# Patient Record
Sex: FEMALE | Race: OTHER | NOT HISPANIC OR LATINO | URBAN - METROPOLITAN AREA
[De-identification: names, ages, dates, MRNs, and addresses within clinical notes are randomized per-mention and may not be internally consistent; named-entity substitution may affect disease eponyms.]

---

## 2019-01-31 NOTE — ASU PATIENT PROFILE, ADULT - PSH
Hammertoe of right foot    History of bunionectomy of both great toes    History of surgery  right thyroid, parathyroid  History of tonsillectomy    Spinal cord stimulator status

## 2019-01-31 NOTE — ASU PATIENT PROFILE, ADULT - PMH
Anxiety    Back pain    Depression    Heart murmur    Hypothyroid    MVP (mitral valve prolapse)    Ovarian cyst

## 2019-02-01 ENCOUNTER — INPATIENT (INPATIENT)
Facility: HOSPITAL | Age: 74
LOS: 0 days | Discharge: HOME | DRG: 743 | End: 2019-02-02
Attending: OBSTETRICS & GYNECOLOGY | Admitting: OBSTETRICS & GYNECOLOGY
Payer: COMMERCIAL

## 2019-02-01 VITALS
RESPIRATION RATE: 16 BRPM | DIASTOLIC BLOOD PRESSURE: 58 MMHG | WEIGHT: 119.93 LBS | OXYGEN SATURATION: 99 % | TEMPERATURE: 97 F | HEART RATE: 88 BPM | HEIGHT: 62 IN | SYSTOLIC BLOOD PRESSURE: 121 MMHG

## 2019-02-01 DIAGNOSIS — Z96.89 PRESENCE OF OTHER SPECIFIED FUNCTIONAL IMPLANTS: Chronic | ICD-10-CM

## 2019-02-01 DIAGNOSIS — Z98.890 OTHER SPECIFIED POSTPROCEDURAL STATES: Chronic | ICD-10-CM

## 2019-02-01 DIAGNOSIS — Z90.89 ACQUIRED ABSENCE OF OTHER ORGANS: Chronic | ICD-10-CM

## 2019-02-01 DIAGNOSIS — M20.41 OTHER HAMMER TOE(S) (ACQUIRED), RIGHT FOOT: Chronic | ICD-10-CM

## 2019-02-01 RX ORDER — SCOPALAMINE 1 MG/3D
1 PATCH, EXTENDED RELEASE TRANSDERMAL ONCE
Qty: 0 | Refills: 0 | Status: COMPLETED | OUTPATIENT
Start: 2019-02-01 | End: 2019-02-01

## 2019-02-01 RX ORDER — ONDANSETRON 8 MG/1
4 TABLET, FILM COATED ORAL ONCE
Qty: 0 | Refills: 0 | Status: DISCONTINUED | OUTPATIENT
Start: 2019-02-01 | End: 2019-02-02

## 2019-02-01 RX ORDER — HYDROMORPHONE HYDROCHLORIDE 2 MG/ML
0.5 INJECTION INTRAMUSCULAR; INTRAVENOUS; SUBCUTANEOUS
Qty: 0 | Refills: 0 | Status: DISCONTINUED | OUTPATIENT
Start: 2019-02-01 | End: 2019-02-02

## 2019-02-01 RX ORDER — METOCLOPRAMIDE HCL 10 MG
10 TABLET ORAL EVERY 6 HOURS
Qty: 0 | Refills: 0 | Status: DISCONTINUED | OUTPATIENT
Start: 2019-02-01 | End: 2019-02-02

## 2019-02-01 RX ORDER — OXYCODONE HYDROCHLORIDE 5 MG/1
5 TABLET ORAL EVERY 6 HOURS
Qty: 0 | Refills: 0 | Status: DISCONTINUED | OUTPATIENT
Start: 2019-02-01 | End: 2019-02-02

## 2019-02-01 RX ORDER — OXYCODONE HYDROCHLORIDE 5 MG/1
10 TABLET ORAL EVERY 6 HOURS
Qty: 0 | Refills: 0 | Status: DISCONTINUED | OUTPATIENT
Start: 2019-02-01 | End: 2019-02-02

## 2019-02-01 RX ORDER — ACETAMINOPHEN 500 MG
650 TABLET ORAL EVERY 6 HOURS
Qty: 0 | Refills: 0 | Status: DISCONTINUED | OUTPATIENT
Start: 2019-02-01 | End: 2019-02-02

## 2019-02-01 RX ORDER — DOCUSATE SODIUM 100 MG
100 CAPSULE ORAL THREE TIMES A DAY
Qty: 0 | Refills: 0 | Status: DISCONTINUED | OUTPATIENT
Start: 2019-02-01 | End: 2019-02-02

## 2019-02-01 RX ORDER — SODIUM CHLORIDE 9 MG/ML
1000 INJECTION, SOLUTION INTRAVENOUS
Qty: 0 | Refills: 0 | Status: DISCONTINUED | OUTPATIENT
Start: 2019-02-01 | End: 2019-02-02

## 2019-02-01 RX ORDER — SIMETHICONE 80 MG/1
80 TABLET, CHEWABLE ORAL EVERY 8 HOURS
Qty: 0 | Refills: 0 | Status: DISCONTINUED | OUTPATIENT
Start: 2019-02-01 | End: 2019-02-02

## 2019-02-01 RX ADMIN — HYDROMORPHONE HYDROCHLORIDE 0.5 MILLIGRAM(S): 2 INJECTION INTRAMUSCULAR; INTRAVENOUS; SUBCUTANEOUS at 22:56

## 2019-02-01 RX ADMIN — SCOPALAMINE 1 PATCH: 1 PATCH, EXTENDED RELEASE TRANSDERMAL at 16:43

## 2019-02-01 RX ADMIN — HYDROMORPHONE HYDROCHLORIDE 0.5 MILLIGRAM(S): 2 INJECTION INTRAMUSCULAR; INTRAVENOUS; SUBCUTANEOUS at 23:51

## 2019-02-01 NOTE — BRIEF OPERATIVE NOTE - PROCEDURE
<<-----Click on this checkbox to enter Procedure Dilation and curettage  02/01/2019    Active  HGOLD2  Robot-assisted salpingo-oophorectomy  02/01/2019  bilateral  Active  HGOLD2

## 2019-02-01 NOTE — H&P ADULT - NSHPPHYSICALEXAM_GEN_ALL_CORE
Gen NAD, AOx3  Chest normal work of breathing  Abdomen: soft, nontender, nondistended, no rebound/guarding  Extremities: nontender, no edema

## 2019-02-01 NOTE — H&P ADULT - ASSESSMENT
74 yo  here for scheduled robotic BSO for ovarian cysts. Admit to preop.  To regional post op, analgesia prn, NPO, IV hydration.

## 2019-02-01 NOTE — PACU DISCHARGE NOTE - COMMENTS
report given to meet johnson vss. pt in nad. lap sites dry and intact ivsite intact. transported on bed with o2

## 2019-02-01 NOTE — H&P ADULT - HISTORY OF PRESENT ILLNESS
74 yo  here for scheduled robotic BSO for ovarian cysts. Patient has been followed w/ q3 month ultrasounds and was recently noted 72 yo  here for scheduled robotic BSO for ovarian cysts. Patient has been followed w/ q3 month ultrasounds and was recently noted to have changes on her most recent ultrasound.     Obhx: 3   Gynhx: b/l ovarian cysts, BTL  PMH: heart murmur, MVP, chronic back pain w/ spinal stimulator, hypothyroid s/p partial thyroidectomy  PSH: multiple orthopedic surgeries, BTL, insertion spinal stimulator '15, b/l hernia repairs  Meds: Trazodone, Wellbutrin, Duloxetine, Spinal stimulator, clonazepam  Allergies: PCN and sulfa --anaphylaxis, adhesives  Social: nonsmoker

## 2019-02-02 VITALS
DIASTOLIC BLOOD PRESSURE: 50 MMHG | TEMPERATURE: 99 F | OXYGEN SATURATION: 97 % | SYSTOLIC BLOOD PRESSURE: 105 MMHG | RESPIRATION RATE: 15 BRPM | HEART RATE: 85 BPM

## 2019-02-02 LAB
ANION GAP SERPL CALC-SCNC: 11 MMOL/L — SIGNIFICANT CHANGE UP (ref 5–17)
BUN SERPL-MCNC: 14 MG/DL — SIGNIFICANT CHANGE UP (ref 7–23)
CALCIUM SERPL-MCNC: 8.9 MG/DL — SIGNIFICANT CHANGE UP (ref 8.4–10.5)
CHLORIDE SERPL-SCNC: 100 MMOL/L — SIGNIFICANT CHANGE UP (ref 96–108)
CO2 SERPL-SCNC: 28 MMOL/L — SIGNIFICANT CHANGE UP (ref 22–31)
CREAT SERPL-MCNC: 0.85 MG/DL — SIGNIFICANT CHANGE UP (ref 0.5–1.3)
GLUCOSE SERPL-MCNC: 138 MG/DL — HIGH (ref 70–99)
HCT VFR BLD CALC: 36.4 % — SIGNIFICANT CHANGE UP (ref 34.5–45)
HGB BLD-MCNC: 11.3 G/DL — LOW (ref 11.5–15.5)
MCHC RBC-ENTMCNC: 26.2 PG — LOW (ref 27–34)
MCHC RBC-ENTMCNC: 31 G/DL — LOW (ref 32–36)
MCV RBC AUTO: 84.5 FL — SIGNIFICANT CHANGE UP (ref 80–100)
PLATELET # BLD AUTO: 170 K/UL — SIGNIFICANT CHANGE UP (ref 150–400)
POTASSIUM SERPL-MCNC: 4.6 MMOL/L — SIGNIFICANT CHANGE UP (ref 3.5–5.3)
POTASSIUM SERPL-SCNC: 4.6 MMOL/L — SIGNIFICANT CHANGE UP (ref 3.5–5.3)
RBC # BLD: 4.31 M/UL — SIGNIFICANT CHANGE UP (ref 3.8–5.2)
RBC # FLD: 12.2 % — SIGNIFICANT CHANGE UP (ref 10.3–16.9)
SODIUM SERPL-SCNC: 139 MMOL/L — SIGNIFICANT CHANGE UP (ref 135–145)
WBC # BLD: 7.1 K/UL — SIGNIFICANT CHANGE UP (ref 3.8–10.5)
WBC # FLD AUTO: 7.1 K/UL — SIGNIFICANT CHANGE UP (ref 3.8–10.5)

## 2019-02-02 RX ADMIN — Medication 650 MILLIGRAM(S): at 06:29

## 2019-02-02 RX ADMIN — SIMETHICONE 80 MILLIGRAM(S): 80 TABLET, CHEWABLE ORAL at 08:18

## 2019-02-02 RX ADMIN — Medication 650 MILLIGRAM(S): at 00:39

## 2019-02-02 RX ADMIN — SCOPALAMINE 1 PATCH: 1 PATCH, EXTENDED RELEASE TRANSDERMAL at 06:21

## 2019-02-02 RX ADMIN — Medication 650 MILLIGRAM(S): at 07:00

## 2019-02-02 RX ADMIN — Medication 650 MILLIGRAM(S): at 01:00

## 2019-02-02 RX ADMIN — SIMETHICONE 80 MILLIGRAM(S): 80 TABLET, CHEWABLE ORAL at 00:39

## 2019-02-02 RX ADMIN — Medication 650 MILLIGRAM(S): at 12:27

## 2019-02-02 NOTE — DISCHARGE NOTE ADULT - CARE PROVIDER_API CALL
Anne-Marie Hendricks)  Gynecologic Oncology; Obstetrics and Gynecology  [address] [place] [address]  Phone: [phone]  Fax: [phone]

## 2019-02-02 NOTE — PROGRESS NOTE ADULT - SUBJECTIVE AND OBJECTIVE BOX
Pt seen and examined at bedside. She reports her pain is well controlled.  She is ambulating without difficulty, voiding spontaneously, passing flatus, tolerating regular diet.   Pt denies fever, chills, chest pain, SOB, nausea, vomiting, lightheadedness, dizziness.      T(F): 98 (02-02-19 @ 05:37), Max: 98 (02-02-19 @ 05:37)  HR: 83 (02-02-19 @ 05:37) (72 - 98)  BP: 95/56 (02-02-19 @ 05:37) (95/56 - 121/58)  RR: 17 (02-02-19 @ 05:37) (10 - 24)  SpO2: 94% (02-02-19 @ 05:37) (92% - 100%)  Wt(kg): --  I&O's Summary    01 Feb 2019 07:01  -  02 Feb 2019 07:00  --------------------------------------------------------  IN: 750 mL / OUT: 750 mL / NET: 0 mL        MEDICATIONS  (STANDING):  acetaminophen   Tablet .. 650 milliGRAM(s) Oral every 6 hours  simethicone 80 milliGRAM(s) Chew every 8 hours    MEDICATIONS  (PRN):  docusate sodium 100 milliGRAM(s) Oral three times a day PRN Stool Softening  metoclopramide Injectable 10 milliGRAM(s) IV Push every 6 hours PRN Nausea and/or Vomiting  ondansetron Injectable 4 milliGRAM(s) IV Push once PRN Postoperative Nausea and/or Vomiting  oxyCODONE    IR 5 milliGRAM(s) Oral every 6 hours PRN Mild Pain (1 - 3)  oxyCODONE    IR 10 milliGRAM(s) Oral every 6 hours PRN Moderate Pain (4 - 6)      Physical Exam:  Constitutional: NAD  Pulmonary: regular work of breathing  Abdomen: incision site clean, dry, intact. Soft, nontender, nondistended, no guarding, no rebound  Extremities: no lower extremity edema or calve tenderness. SCDs in place     LABS:                        11.3   7.1   )-----------( 170      ( 02 Feb 2019 06:31 )             36.4

## 2019-02-02 NOTE — DISCHARGE NOTE ADULT - HOSPITAL COURSE
Patient is status post a robotic assisted BSO. She had an uncomplicated postoperative course and has met her postoperative milestones appropriately.  Vitals are stable for discharge.

## 2019-02-02 NOTE — DISCHARGE NOTE ADULT - INSTRUCTIONS
Follow up with Dr. Hendricks. You have steri-strips on your surgical incision sites. steri-strips will fall off after a few showers, please do not pull it off. Call Dr. Hendricks if you have any questions or concerns.

## 2019-02-02 NOTE — DISCHARGE NOTE ADULT - CONDITIONS AT DISCHARGE
VSS. tolerated diet, passed flatus, voided freely adequate amount. Lap surgical incision sites x4 with steri-strips remained C/D/I, noted some bruising on incision sites except on umbilical area. medically cleared for discharge by team MD, D/C instructions given, verbalized her understanding.

## 2019-02-02 NOTE — PROGRESS NOTE ADULT - ASSESSMENT
73y Female POD#0    s/p RA BSO                                        1. Neuro/Pain:  tylenol, oxycodone PRN  2  CV:   VS per routine  3. Pulm: Encourage ISS  4. GI: reg  5. :  voiding  6. Heme: AM CBC, BMP  7. ID: --  8. DVT ppx: SCDs  9. Dispo: Likely POD#1

## 2019-02-02 NOTE — DISCHARGE NOTE ADULT - PLAN OF CARE
Healthy recovery - Nothing in vagina - no intercourse, tampons, or douching until cleared by your doctor.   - Avoid swimming, tub baths, and heavy lifting until cleared by your doctor.   - Showering is ok.   - Continue oral pain medications as needed for pain.    - Follow up in office in 1-2 weeks for your postoperative visit.    - Call the office sooner if you develop any fever, heavy bleeding, or severe pain.  Go to the closest emergency room for any of these symptoms if you are not able to contact your doctor.

## 2019-02-02 NOTE — DISCHARGE NOTE ADULT - CARE PLAN
Principal Discharge DX:	S/P BSO (bilateral salpingo-oophorectomy)  Goal:	Healthy recovery  Assessment and plan of treatment:	- Nothing in vagina - no intercourse, tampons, or douching until cleared by your doctor.   - Avoid swimming, tub baths, and heavy lifting until cleared by your doctor.   - Showering is ok.   - Continue oral pain medications as needed for pain.    - Follow up in office in 1-2 weeks for your postoperative visit.    - Call the office sooner if you develop any fever, heavy bleeding, or severe pain.  Go to the closest emergency room for any of these symptoms if you are not able to contact your doctor.

## 2019-02-02 NOTE — PROGRESS NOTE ADULT - SUBJECTIVE AND OBJECTIVE BOX
GYN POC   Patient seen at bedside.  Pain controlled. Tolerating regular diet.  No OOB yet.  Passed TOV on bedpan.    Denies CP, palpitations, SOB, fever, chills, nausea, vomiting.    Vital Signs Last 24 Hrs  T(C): 36.1 (01 Feb 2019 23:30), Max: 36.1 (01 Feb 2019 12:27)  T(F): 97 (01 Feb 2019 23:30), Max: 97 (01 Feb 2019 23:30)  HR: 85 (01 Feb 2019 23:30) (72 - 98)  BP: 102/60 (01 Feb 2019 23:30) (97/43 - 121/58)  BP(mean): 67 (01 Feb 2019 22:17) (63 - 80)  RR: 14 (01 Feb 2019 23:30) (10 - 24)  SpO2: 97% (01 Feb 2019 23:30) (92% - 100%)    Physical Exam:  Gen: No Acute Distress  Pulm: Clear to auscultation bilaterally  GI: soft, appropriately tender, mildly distended, decreased BS, no rebound, no guarding.  Incisions C/D/I  : Chavarria in place  Ext: SCDs in place, wnl    I&O's Summary    01 Feb 2019 07:01  -  02 Feb 2019 00:34  --------------------------------------------------------  IN: 750 mL / OUT: 300 mL / NET: 450 mL      MEDICATIONS  (STANDING):  acetaminophen   Tablet .. 650 milliGRAM(s) Oral every 6 hours  lactated ringers. 1000 milliLiter(s) (125 mL/Hr) IV Continuous <Continuous>  simethicone 80 milliGRAM(s) Chew every 8 hours    MEDICATIONS  (PRN):  docusate sodium 100 milliGRAM(s) Oral three times a day PRN Stool Softening  metoclopramide Injectable 10 milliGRAM(s) IV Push every 6 hours PRN Nausea and/or Vomiting  ondansetron Injectable 4 milliGRAM(s) IV Push once PRN Postoperative Nausea and/or Vomiting  oxyCODONE    IR 5 milliGRAM(s) Oral every 6 hours PRN Mild Pain (1 - 3)  oxyCODONE    IR 10 milliGRAM(s) Oral every 6 hours PRN Moderate Pain (4 - 6)    Allergies    adhesives (Hives; Rash)  dairy products (Unknown)  Darvocet-N 50 (Unknown)  Demerol (Unknown)  Gluten (Unknown)  penicillins (Unknown)  sulfa drugs (Unknown)  Tegaderm (Rash; Urticaria)    Intolerances

## 2019-02-02 NOTE — DISCHARGE NOTE ADULT - NS AS DC FOLLOWUP STROKE INST
[employer] Patient Education: Surgical Wound Discharge Instructions/Influenza vaccination (VIS Pub Date: August 7, 2015)

## 2019-02-02 NOTE — DISCHARGE NOTE ADULT - PATIENT PORTAL LINK FT
You can access the [facility] Patient Portal, offered by [facility], by registering with the following website: [email or web address]

## 2019-02-02 NOTE — PROGRESS NOTE ADULT - ASSESSMENT
73y Female POD#1    s/p RA BSO                                        1. Neuro/Pain:  tylenol, oxycodone PRN  2  CV:   VS per routine  3. Pulm: Encourage ISS  4. GI: reg  5. :  voiding  6. Heme: AM CBC, BMP  7. ID: --  8. DVT ppx: SCDs  9. Dispo: Likely POD#1

## 2019-02-05 LAB — SURGICAL PATHOLOGY STUDY: SIGNIFICANT CHANGE UP

## 2019-02-05 PROCEDURE — 86850 RBC ANTIBODY SCREEN: CPT

## 2019-02-05 PROCEDURE — 36415 COLL VENOUS BLD VENIPUNCTURE: CPT

## 2019-02-05 PROCEDURE — 88305 TISSUE EXAM BY PATHOLOGIST: CPT

## 2019-02-05 PROCEDURE — 85027 COMPLETE CBC AUTOMATED: CPT

## 2019-02-05 PROCEDURE — 80048 BASIC METABOLIC PNL TOTAL CA: CPT

## 2019-02-05 PROCEDURE — 88307 TISSUE EXAM BY PATHOLOGIST: CPT

## 2019-02-05 PROCEDURE — C1889: CPT

## 2019-02-05 PROCEDURE — S2900: CPT

## 2019-02-05 PROCEDURE — 86900 BLOOD TYPING SEROLOGIC ABO: CPT

## 2019-02-05 PROCEDURE — 86901 BLOOD TYPING SEROLOGIC RH(D): CPT

## 2019-02-12 DIAGNOSIS — F32.9 MAJOR DEPRESSIVE DISORDER, SINGLE EPISODE, UNSPECIFIED: ICD-10-CM

## 2019-02-12 DIAGNOSIS — Z88.0 ALLERGY STATUS TO PENICILLIN: ICD-10-CM

## 2019-02-12 DIAGNOSIS — N83.201 UNSPECIFIED OVARIAN CYST, RIGHT SIDE: ICD-10-CM

## 2019-02-12 DIAGNOSIS — R01.1 CARDIAC MURMUR, UNSPECIFIED: ICD-10-CM

## 2019-02-12 DIAGNOSIS — Z96.89 PRESENCE OF OTHER SPECIFIED FUNCTIONAL IMPLANTS: ICD-10-CM

## 2019-02-12 DIAGNOSIS — Z88.2 ALLERGY STATUS TO SULFONAMIDES: ICD-10-CM

## 2019-02-12 DIAGNOSIS — E89.0 POSTPROCEDURAL HYPOTHYROIDISM: ICD-10-CM

## 2019-02-12 DIAGNOSIS — F41.9 ANXIETY DISORDER, UNSPECIFIED: ICD-10-CM

## 2019-02-12 DIAGNOSIS — I34.1 NONRHEUMATIC MITRAL (VALVE) PROLAPSE: ICD-10-CM

## 2019-02-12 DIAGNOSIS — M54.9 DORSALGIA, UNSPECIFIED: ICD-10-CM

## 2019-02-12 DIAGNOSIS — N83.202 UNSPECIFIED OVARIAN CYST, LEFT SIDE: ICD-10-CM

## 2019-02-12 DIAGNOSIS — Z91.011 ALLERGY TO MILK PRODUCTS: ICD-10-CM

## 2019-02-12 DIAGNOSIS — Z28.21 IMMUNIZATION NOT CARRIED OUT BECAUSE OF PATIENT REFUSAL: ICD-10-CM

## 2024-04-15 NOTE — DISCHARGE NOTE ADULT - NSCORESITESY/N_GEN_A_CORE_RD
Decrease to Pantoprazole 40 mg daily prior to breakfast. Continue Famotidine 20 mg after dinner. If symptoms worsen on this regimen, then call the office and we can increase back to Pantoprazole twice daily. Start Metamucil 1 heaping tablespoonful mixed with 8 ounces of juice or water once daily to bulk the stool. Follow-up in the office in 1 year.   No